# Patient Record
Sex: MALE | Race: WHITE | URBAN - METROPOLITAN AREA
[De-identification: names, ages, dates, MRNs, and addresses within clinical notes are randomized per-mention and may not be internally consistent; named-entity substitution may affect disease eponyms.]

---

## 2024-08-24 ENCOUNTER — OFFICE VISIT (OUTPATIENT)
Dept: ENDOCRINOLOGY CLINIC | Facility: CLINIC | Age: 29
End: 2024-08-24

## 2024-08-24 ENCOUNTER — TELEPHONE (OUTPATIENT)
Dept: ENDOCRINOLOGY CLINIC | Facility: CLINIC | Age: 29
End: 2024-08-24

## 2024-08-24 ENCOUNTER — LAB ENCOUNTER (OUTPATIENT)
Dept: LAB | Facility: HOSPITAL | Age: 29
End: 2024-08-24
Attending: FAMILY MEDICINE
Payer: COMMERCIAL

## 2024-08-24 VITALS
BODY MASS INDEX: 44.1 KG/M2 | SYSTOLIC BLOOD PRESSURE: 123 MMHG | HEIGHT: 71 IN | HEART RATE: 80 BPM | DIASTOLIC BLOOD PRESSURE: 85 MMHG | WEIGHT: 315 LBS

## 2024-08-24 DIAGNOSIS — E03.9 ACQUIRED HYPOTHYROIDISM: Primary | ICD-10-CM

## 2024-08-24 DIAGNOSIS — E07.9 DISEASE OF THYROID GLAND: ICD-10-CM

## 2024-08-24 DIAGNOSIS — E03.9 MYXEDEMA HEART DISEASE: Primary | ICD-10-CM

## 2024-08-24 DIAGNOSIS — I51.9 MYXEDEMA HEART DISEASE: Primary | ICD-10-CM

## 2024-08-24 DIAGNOSIS — E66.01 MORBID OBESITY (HCC): ICD-10-CM

## 2024-08-24 DIAGNOSIS — E66.01 CLASS 3 SEVERE OBESITY WITH BODY MASS INDEX (BMI) OF 45.0 TO 49.9 IN ADULT, UNSPECIFIED OBESITY TYPE, UNSPECIFIED WHETHER SERIOUS COMORBIDITY PRESENT (HCC): ICD-10-CM

## 2024-08-24 DIAGNOSIS — E07.9 THYROID DISEASE: ICD-10-CM

## 2024-08-24 DIAGNOSIS — E03.9 ACQUIRED HYPOTHYROIDISM: ICD-10-CM

## 2024-08-24 LAB
ANION GAP SERPL CALC-SCNC: 7 MMOL/L (ref 0–18)
BUN BLD-MCNC: 10 MG/DL (ref 9–23)
BUN/CREAT SERPL: 11.8 (ref 10–20)
CALCIUM BLD-MCNC: 9.4 MG/DL (ref 8.7–10.4)
CHLORIDE SERPL-SCNC: 109 MMOL/L (ref 98–112)
CHOLEST SERPL-MCNC: 197 MG/DL (ref ?–200)
CO2 SERPL-SCNC: 26 MMOL/L (ref 21–32)
CREAT BLD-MCNC: 0.85 MG/DL
EGFRCR SERPLBLD CKD-EPI 2021: 122 ML/MIN/1.73M2 (ref 60–?)
EST. AVERAGE GLUCOSE BLD GHB EST-MCNC: 108 MG/DL (ref 68–126)
FASTING PATIENT LIPID ANSWER: YES
FASTING STATUS PATIENT QL REPORTED: YES
GLUCOSE BLD-MCNC: 78 MG/DL (ref 70–99)
HBA1C MFR BLD: 5.4 % (ref ?–5.7)
HDLC SERPL-MCNC: 42 MG/DL (ref 40–59)
LDLC SERPL CALC-MCNC: 130 MG/DL (ref ?–100)
NONHDLC SERPL-MCNC: 155 MG/DL (ref ?–130)
OSMOLALITY SERPL CALC.SUM OF ELEC: 292 MOSM/KG (ref 275–295)
POTASSIUM SERPL-SCNC: 4.6 MMOL/L (ref 3.5–5.1)
SODIUM SERPL-SCNC: 142 MMOL/L (ref 136–145)
T4 FREE SERPL-MCNC: 1.2 NG/DL (ref 0.8–1.7)
THYROPEROXIDASE AB SERPL-ACNC: 50 U/ML (ref ?–60)
TRIGL SERPL-MCNC: 140 MG/DL (ref 30–149)
TSI SER-ACNC: 3.12 MIU/ML (ref 0.55–4.78)
VLDLC SERPL CALC-MCNC: 25 MG/DL (ref 0–30)

## 2024-08-24 PROCEDURE — 99205 OFFICE O/P NEW HI 60 MIN: CPT | Performed by: INTERNAL MEDICINE

## 2024-08-24 PROCEDURE — 83036 HEMOGLOBIN GLYCOSYLATED A1C: CPT

## 2024-08-24 PROCEDURE — 84443 ASSAY THYROID STIM HORMONE: CPT | Performed by: INTERNAL MEDICINE

## 2024-08-24 PROCEDURE — 3079F DIAST BP 80-89 MM HG: CPT | Performed by: INTERNAL MEDICINE

## 2024-08-24 PROCEDURE — 3074F SYST BP LT 130 MM HG: CPT | Performed by: INTERNAL MEDICINE

## 2024-08-24 PROCEDURE — 80061 LIPID PANEL: CPT

## 2024-08-24 PROCEDURE — 80048 BASIC METABOLIC PNL TOTAL CA: CPT

## 2024-08-24 PROCEDURE — 3008F BODY MASS INDEX DOCD: CPT | Performed by: INTERNAL MEDICINE

## 2024-08-24 PROCEDURE — 86800 THYROGLOBULIN ANTIBODY: CPT

## 2024-08-24 PROCEDURE — 84439 ASSAY OF FREE THYROXINE: CPT | Performed by: INTERNAL MEDICINE

## 2024-08-24 PROCEDURE — 36415 COLL VENOUS BLD VENIPUNCTURE: CPT | Performed by: INTERNAL MEDICINE

## 2024-08-24 PROCEDURE — 84432 ASSAY OF THYROGLOBULIN: CPT

## 2024-08-24 PROCEDURE — 86376 MICROSOMAL ANTIBODY EACH: CPT | Performed by: INTERNAL MEDICINE

## 2024-08-24 RX ORDER — LEVOTHYROXINE SODIUM 75 UG/1
1 TABLET ORAL DAILY
COMMUNITY
Start: 2023-05-31

## 2024-08-24 NOTE — PATIENT INSTRUCTIONS
Will do labs today   If labs are normal, follow up annually   If labs are abnormal, we will discuss more next visit   Will review and will start Thyroid medication  levothyroxine if needed    Take you medication on empty stomach, not with any other medication or food. Wait 60 minutes before eating. Wait 4 hours before taking Vitamins, Calcium or iron.  Wait 60 minutes before eating. Do not take the medication on the morning of the lab test. Do not take Biotin/Turmeric 1 week before the test.    During pregnancy, we need to increase thyroid medication dose (take double your usual       This is a very helpful website     [email or web address]-thyroid-information/  [email or web address]://[email or web address].org/thyroid-information/      Don’t take your thyroid hormone at the same time as:  Walnuts.  Soybean flour.  Cottonseed meal.  Iron supplements or multivitamins containing iron.  Calcium supplements.  Antacids that contain aluminum, magnesium or calcium.  Some ulcer medicines, such as sucralfate (Carafate).  Some cholesterol-lowering drugs, such as those containing cholestyramine (Prevalite, Locholest) and colestipol (Colestid).  To avoid possible problems, eat these foods or use these products several hours before or after you take your thyroid medicine.    Supplements containing biotin, common in hair and nail products, can make it hard to measure how much thyroid hormone is in the body. Biotin does not affect thyroid hormone levels. But supplements that have biotin should be stopped for at least a week before measuring thyroid function so that the measurement is correct.

## 2024-08-24 NOTE — TELEPHONE ENCOUNTER
Please call pt with work result    normal Thyroid levels- does not need thyroid medicine now     neg hashimoto's antibodies       LDL is high- limit high fat /fried food     Follow up with PCP and RD   Thyroid labs annually       Thanks

## 2024-08-24 NOTE — PROGRESS NOTES
New Patient Evaluation - History and Physical    CONSULT - Reason for Visit:    hypothyroid, obese   Requesting Physician:   ..No primary care provider on file.    CHIEF COMPLAINT:    Chief Complaint   Patient presents with    Consult     Pt in to discuss thyroid issues         HISTORY OF PRESENT ILLNESS:   Rox Finnegan is a 29 year old male who presents with  hypothyroid, obese DLP  Was seen with his GF   Was started on LT4 few yrs ago , for 1.5 yrs. 75 mcg/day    Stopped d/t lack of benefit early 2024  He denied feeling difference while on off LT4.        Compression symptoms: denied except the underlined ones SOB, dysphagia, odynophagia, change in voice, hoarseness, neck pain or neck mass.     The patient endorses the bolded symptoms: intolerance to cold, constipation, decreased lacrimation, fatigue; anxiety, heat intolerance, insomnia, tremors, wt loss, wt gain during covid, lid lag, palpitations, proptosis,   dyspnea, difficulty breathing when lying down, dysphagia, sensation of food getting stuck in the throat, choking sensation when lying flat, pooling of saliva, dysphonia, voice changes, hoarseness; none.    No eye or vision changes.   + heat   intolerance  Hair and skin: hair loss      Neck surgery: No  Neck radiation: No   Biotin: no  Turmeric:no  Family history of thyroid cancer in 1st degree relatives: no      GF reports snoring but he does not stop breathing   He reports sometimes get headache in the morning or does not feel refreshed when gets up in the morning     Lab Results   Component Value Date    A1C 5.4 08/24/2024           ASSESSMENT AND PLAN:  Rox Finnegan is a 29 year old male who presents with  hypothyroid, obese DLP  He was on LT4 but stopped few months ago   Clinically nonspecific symptoms.   Labs today showed normal TFT and neg TPO. CMP is normal. LDL is high.   Plan   Discussed wt loss and lifestyle changes   Refer to RD.   Follow up with PCP     PAST MEDICAL HISTORY:   History  reviewed. No pertinent past medical history.  Hypothyroid  Eczema    PAST SURGICAL HISTORY:   History reviewed. No pertinent surgical history.  no  CURRENT MEDICATIONS:    No outpatient medications have been marked as taking for the 8/24/24 encounter (Office Visit) with Miguel Medeiros MD.   no    ALLERGIES:  Not on File  no  SOCIAL HISTORY:    Social History     Socioeconomic History    Marital status: Single   Tobacco Use    Smoking status: Never    Smokeless tobacco: Never   HR   Smoking cigars/marijuana  Marijuana x3/wk  Etoh x2/mo  Drugs no  FAMILY HISTORY:   History reviewed. No pertinent family history.   + DM in [place]   Colon cancer in granparent's siblings    REVIEW OF SYSTEMS:  All negative other than HPI    PHYSICAL EXAM:   Height: 5' 11\" (180.3 cm) (08/24 1043)  Weight: 330 lb (149.7 kg) (08/24 1043)  BSA (Calculated - sq m): 2.61 sq meters (08/24 1043)  Pulse: 80 (08/24 1043)  BP: 123/85 (08/24 1043)  Temp: --  Do Not Use - Resp Rate: --  SpO2: --    Body mass index is 46.03 kg/m².    CONSTITUTIONAL:  Awake and alert. Age appropriate, good hygiene not in acute distress. Well-nourished and well developed. no acute distress   PSYCH:   Orientated to time, place, person & situation, Normal mood and affect, memory intact, normal insight and judgment, cooperative  Neuro: speech is clear. Awake, alert, no aphasia, no facial asymmetry, no nuchal rigidity  EYES:  No proptosis, no ptosis, conjunctiva normal  ENT:  Normocephalic, atraumatic  Eye: EOMI, normal lids, no discharge, no conjunctival erythema. No exophthalmos/proptosis, Ptosis negative   No rhinorrhea, moist oral mucosa  Neck: full range of motion  Neck/Thyroid: neck inspection: normal, No scar, No goiter   LUNGS:  No acute respiratory distress, non-labored respiration. Speaking full sentences  CARDIOVASCULAR:  regular rate   ABDOMEN:  No abdominal pain.   SKIN:  no bruising or bleeding, no rashes and no lesions, Skin is dry, no obvious rashes or  lesions  EXTREMITIES: no gross abnormality   MSK: Moves extremities spontaneously. full range of motion in all major joints      DATA:     Pertinent data reviewed     4/6/19  1:21 PM    HEMOGLOBIN A1C  <=5.6 % 5.3     4/6/19  1:21 PM    SODIUM  135 - 148 mEq/L 142   POTASSIUM  3.4 - 5.2 mEq/L 4.3   CHLORIDE  96 - 109 mEq/L 105   CO2 TOTAL  24 - 33 mEq/L 29.1   ANION GAP  10 - 20 12.2   UREA NITROGEN (BUN)  10 - 26 mg/dL 17   CREATININE  0.7 - 1.5 mg/dL 0.95   BUN/CREAT RATIO  8 - 25 ratio 17.9   eGFR AFR AMERICAN >=60   eGFR NON AFR AMER >=60       GLUCOSE  60 - 99 mg/dL 85   Comment:     CALCIUM  8.2 - 10.7 mg/dL 9.2   TOTAL PROTEIN  6 - 8.5 g/dL 7.3   ALBUMIN  3 - 5.5 g/dL 4.2   BILIRUBIN TOTAL  0.2 - 1.2 mg/dL 0.87   ALK PHOSPHATASE  30 - 130 IU/L 60   ALT  0 - 65 IU/L 87 High        4/6/19  1:21 PM    CHOLESTEROL  115 - 199 mg/dL 181   TRIGLYCERIDE  30 - 150 mg/dL 90   HDL CHOLESTEROL  See comment mg/dL 49 Low    Comment: HDL CHOL <40 - Major Risk Factor for CHD  HDL CHOL 40-58 - Moderate Risk Factor for CHD  HDL CHOL >=59 - Negative Risk Factor for CHD   LDL CHOL CALC  See comment mg/dL 114 High         Recent Labs     08/24/24  1138   TSH 3.123   T4F 1.2     No results found.  Lipid Panel        Component Value Flag Ref Range Units Status    Cholesterol, Total 197      <200 mg/dL Final    Comment:    Desirable  <200 mg/dL  Borderline  200-239 mg/dL  High      >=240 mg/dL        HDL Cholesterol 42      40 - 59 mg/dL Final    Comment:    Interpretive Information:   An HDL cholesterol <40 mg/dL is low and constitutes a coronary heart disease risk factor. An HDL cholesterol >60 mg/dL is a negative risk factor for coronary heart disease.        Triglycerides 140      30 - 149 mg/dL Final    Comment:    Reference interval for fasting triglycerides  Desirable: <150 mg/dL  Borderline: 150-199 mg/dL  High: 200-499 mg/dL  Very High: >=500 mg/dL          LDL Cholesterol 130      <100 mg/dL Final    Comment:    Optimal             <100 mg/dL   Near/Above OptimaL 100-129 mg/dL   Borderline High    130-159 mg/dL   High               160-189 mg/dL    Very High          >190 mg/dL         VLDL 25      0 - 30 mg/dL Final    Non HDL Chol 155      <130 mg/dL Final    Comment:    Desirable  <130 mg/dL   Borderline  130-159 mg/dL   High        160-189 mg/dL       Very high >=190 mg/dL        Patient Fasting for Lipid? Yes        Final                  Basic Metabolic Panel (8)        Component Value Flag Ref Range Units Status    Glucose 78      70 - 99 mg/dL Final    Sodium 142      136 - 145 mmol/L Final    Potassium 4.6      3.5 - 5.1 mmol/L Final    Chloride 109      98 - 112 mmol/L Final    CO2 26.0      21.0 - 32.0 mmol/L Final    Anion Gap 7      0 - 18 mmol/L Final    BUN 10      9 - 23 mg/dL Final    Creatinine 0.85      0.70 - 1.30 mg/dL Final    BUN/CREA Ratio 11.8      10.0 - 20.0  Final    Calcium, Total 9.4      8.7 - 10.4 mg/dL Final    Calculated Osmolality 292      275 - 295 mOsm/kg Final    eGFR-Cr 122      >=60 mL/min/1.73m2 Final    Patient Fasting for BMP? Yes        Final                  Hemoglobin A1C [E]        Component Value Flag Ref Range Units Status    HgbA1C 5.4      <5.7 % Final    Comment:     Normal HbA1C:     <5.7%      Pre-Diabetic:     5.7 - 6.4%      Diabetic:         >6.4%      Diabetic Control: <7.0%        Estimated Average Glucose 108      68 - 126 mg/dL Final    Comment:    eAG is the estimated average glucose calculated from Hgb A1c according to the formula recommended by the American Diabetes Association. eAG levels reflect the long term average glucose and may not correlate with random or fasting glucose levels since these represent specific points in time.                         Thyroid Peroxidase (TPO) AB        Component Value Flag Ref Range Units Status    Anti-Thyroperoxidase 50      <60 U/mL Final                  TSH and Free T4        Component Value Flag Ref Range Units Status    Free T4  1.2      0.8 - 1.7 ng/dL Final    TSH 3.123      0.550 - 4.780 mIU/mL Final                  Orders Placed This Encounter   Procedures    Thyroid Peroxidase (TPO) AB    TSH and Free T4    Lipid Panel    Basic Metabolic Panel (8)    Hemoglobin A1C [E]     Orders Placed This Encounter    Thyroid Peroxidase (TPO) AB     Order Specific Question:   Release to patient     Answer:   Immediate    TSH and Free T4     Order Specific Question:   Release to patient     Answer:   Immediate    Lipid Panel     Standing Status:   Future     Number of Occurrences:   1     Standing Expiration Date:   8/24/2025     Order Specific Question:   Release to patient     Answer:   Immediate    Basic Metabolic Panel (8)     Standing Status:   Future     Number of Occurrences:   1     Standing Expiration Date:   8/24/2025     Order Specific Question:   Release to patient     Answer:   Immediate    Hemoglobin A1C [E]     Standing Status:   Future     Number of Occurrences:   1     Standing Expiration Date:   8/24/2025     Order Specific Question:   Release to patient     Answer:   Immediate    levothyroxine 75 MCG Oral Tab     Sig: Take 1 tablet (75 mcg total) by mouth daily.          This is a specialized patient consultation in endocrinology and required comprehensive review of prior records, as well as current evaluation, with time required for consideration of complex endocrine issues and consultation. For this visit, I personally interviewed the patient, and family member if accompanied, performed the pertinent parts of the history and physical examination. ROS included screening for appropriate endocrine conditions.   Today's diagnosis and plan were reviewed in detail with the patient who states understanding and agrees with plan. I discussed with the patient possible diagnosis, differential diagnosis, need for work up, treatment options, alternatives and side effects.     Please see note for details about time spent which includes:   ·  pre-visit preparation  · reviewing records  · face to face time with the patient   · timely documentation of the encounter  · ordering medications/tests  · communication with care team  · care coordination    I appreciate the opportunity to be part of your patient's medical care and will keep you, as the referring and primary physicians, informed about the care of your patient. Please feel free to contact me should you have any questions.    The 21st Century Cures Act makes medical notes like these available to patients in the interest of transparency. Please be advised this is a medical document. Medical documents are intended to carry relevant information, facts as evident, and the clinical opinion of the practitioner. The medical note is intended as peer to peer communication and may appear blunt or direct. It is written in medical language and may contain abbreviations or verbiage that are unfamiliar.   Miguel Medeiros MD

## 2024-08-27 LAB
THYROGLOB AB: <1 IU/ML
THYROGLOB IMA: 15.1 NG/ML

## 2025-06-18 NOTE — PROGRESS NOTES
Subjective:   Rox Finnegan is a 30 year old male who presents for Physical (Lab order, (ACT Score # 24))   Patient is a pleasant 30-year-old male with past medical history significant for obesity and thyroid disorder not on medication following with endocrinology Waldem[facility].  Patient presents office today new to this office and new to this provider to establish care and obtain physical.  Patient states his health is ok. Knows he needs to lose weight. Also has issues with rotating abdominal pain in LLQ and acid reflux. Also has periods of apnea.     PMH: Asthma as child. No issues at current.  Eczema: no issues uses lotions.   Thyroid issues: off meds.   PSH: none  Meds: none    Diet: Higher protein, more vegetables, more fruit. Mostly home cooked. Coffee and pushing more water. Stays away from pop.   Exercise: not as much as he would like. Occasional walk. Wants to walk more. Educated on recommendations.  Sleep: 7-8 hours per night. Waking up and snoring loudly, can have periods of apnea. Wakes up gasping. Has daytime fatigue.  Stress: GF 5 weeks pregnant. Likes to watch football, golf, and play video games.   Social: Dating, lives in [place], owns home, baby on the way   Sexually Active: yes  Prophylaxis: currently pregnant.   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   Vaccinations: Tetanus in office to            Past Medical History[1]   Past Surgical History[2]     History/Other:    Chief Complaint Reviewed and Verified  Nursing Notes Reviewed and   Verified  Tobacco Reviewed  Allergies Reviewed  Medications Reviewed    Problem List Reviewed  Medical History Reviewed  Surgical History   Reviewed  Family History Reviewed  Social History Reviewed         Tobacco:  Tobacco Use[3]  E-Cigarettes/Vaping       Questions Responses    E-Cigarette Use Never User    Passive Exposure No          E-Cigarette/Vaping Substances        Questions Responses    Nicotine No    THC No    CBD No    Flavoring No          E-Cigarette/Vaping Devices       Questions Responses    Disposable No    Pre-filled or Refillable Cartridge No    Refillable Tank No    Pre-filled Pod No           Tobacco cessation counseling for <3 minutes.    Current Medications[4]      Review of Systems:  Review of Systems   Constitutional: Negative.  Negative for activity change, chills and fever.   HENT: Negative.  Negative for congestion, ear pain, postnasal drip, sinus pain, sore throat and trouble swallowing.    Respiratory:  Positive for apnea. Negative for cough, shortness of breath and wheezing.    Cardiovascular: Negative.  Negative for chest pain and leg swelling.   Gastrointestinal: Negative.  Negative for abdominal pain, blood in stool, constipation, diarrhea, nausea and vomiting.   Endocrine: Negative.    Genitourinary: Negative.  Negative for difficulty urinating, dysuria and flank pain.   Musculoskeletal: Negative.  Negative for arthralgias, back pain and neck stiffness.   Skin: Negative.  Negative for color change and rash.   Neurological: Negative.  Negative for dizziness and headaches.   Hematological:  Negative for adenopathy.         Objective:   /75 (BP Location: Left arm, Patient Position: Sitting, Cuff Size: large)   Pulse 83   Ht 5' 11\" (1.803 m)   Wt (!) 332 lb (150.6 kg)   SpO2 97%   BMI 46.30 kg/m²  Estimated body mass index is 46.3 kg/m² as calculated from the following:    Height as of this encounter: 5' 11\" (1.803 m).    Weight as of this encounter: 332 lb (150.6 kg).  Physical Exam  Vitals and nursing note reviewed.   Constitutional:       Appearance: Normal appearance. He is obese.   HENT:      Head: Normocephalic.      Right Ear: Tympanic membrane, ear canal and external ear normal. There is no impacted cerumen.      Left Ear: Tympanic membrane, ear canal and external ear normal. There is no impacted cerumen.      Nose: Nose normal. No  congestion or rhinorrhea.      Mouth/Throat:      Mouth: Mucous membranes are moist.      Pharynx: No oropharyngeal exudate or posterior oropharyngeal erythema.   Eyes:      Extraocular Movements: Extraocular movements intact.      Pupils: Pupils are equal, round, and reactive to light.   Cardiovascular:      Rate and Rhythm: Normal rate and regular rhythm.      Pulses: Normal pulses.      Heart sounds: Normal heart sounds. No murmur heard.  Pulmonary:      Effort: Pulmonary effort is normal. No respiratory distress.      Breath sounds: Normal breath sounds. No wheezing.   Abdominal:      General: There is no distension.      Palpations: Abdomen is soft.      Tenderness: There is no abdominal tenderness.   Musculoskeletal:         General: Normal range of motion.      Cervical back: Normal range of motion and neck supple.      Right lower leg: No edema.      Left lower leg: No edema.   Lymphadenopathy:      Cervical: No cervical adenopathy.   Skin:     General: Skin is warm and dry.      Capillary Refill: Capillary refill takes less than 2 seconds.      Findings: No rash.   Neurological:      General: No focal deficit present.      Mental Status: He is alert and oriented to person, place, and time.   Psychiatric:         Mood and Affect: Mood normal.         Behavior: Behavior normal.           Assessment & Plan:   1. Encounter for wellness examination in adult (Primary)  -     Hemoglobin A1C; Future; Expected date: 06/19/2025  -     CBC With Differential With Platelet; Future; Expected date: 06/19/2025  -     Comp Metabolic Panel (14); Future; Expected date: 06/19/2025  -     TSH W Reflex To Free T4; Future; Expected date: 06/19/2025  -     Lipid Panel; Future; Expected date: 06/19/2025  2. Morbid obesity (HCC)  -     Diagnostic Sleep Study  -     [facility] Weight Management Medical Nutrition Therapy DIETITIAN - [place] Location  3. LLQ pain  4. Apnea  -     General sleep study; Future; Expected date:  07/19/2025  5. Need for tetanus booster  6. Epigastric pain  -     Famotidine; Take 1 tablet (20 mg total) by mouth 2 (two) times daily as needed for Heartburn.  Dispense: 40 tablet; Refill: 1    1. Encounter for wellness examination in adult  Wellness labs ordered.  Encouraged increasing physical activity which includes raising heart rate 3 to 5 days/week for at least 30 minutes at a time, while also performing mild strength exercises.  Patient counseled on importance of dietary modifications, which may include limiting fats, red meat, and carbohydrates, while increasing fruit and vegetable intake, and trying to adhere to a low sodium/Mediterranean diet.  Encouraged to manage stress.  Encouraged good sleep habits.  Encouraged good sexual health.    - Hemoglobin A1C; Future  - CBC With Differential With Platelet; Future  - Comp Metabolic Panel (14); Future  - TSH W Reflex To Free T4; Future  - Lipid Panel; Future    2. Morbid obesity (HCC)  -BMI in office today 46.30  -Nutrition consult placed  -Recommend low fat/low cholesterol diet and mediterranean diet  -Advised to decrease the amount of carbohydrates in diet (bread products, rice, pasta, potatoes, cereals, starchy vegetables)  -Avoid fried, fatty, greasy foods, cheese, red-meats, egg yolks and processed foods  -Recommend to cut out foods and beverages that contain high sugars.  -Increase your vegetable intake.  -Consider over the counter metamucil once daily with 8 oz water to lower cholesterol   -Try to exercise at least 30 minutes per day 3-5 times per week with mixture of cardio and weight training.     - DIAGOSTIC SLEEP STUDY  - Located within [facility] Weight Management Medical Nutrition Therapy DIETITIAN - [place] Location    3. LLQ pain  Rotating abdominal pain  Eating more veggies  Gas pain  Educated on gas x otc  List of fodmaps provided    4. Apnea  STOP BANG - 6  Do you snore loudly? y  Do you often feel tired, fatigued, or sleepy during the daytime? y  Has  anyone observed you stop breathing during sleep? y  Do you have or are you being treated for high blood pressure? n  BMI greater than 35? y  Age greater than 50? n  Neck circumference greater than 40 cm?y  Male Gender? y    Obtain sleep study    - General sleep study; Future    5. Need for tetanus booster  Advise tetanus closer to term for pregnant SO  Will call and place order    6. Epigastric pain  Intermittent pain epigastric   Trial pepcid  FU as needed  - famotidine 20 MG Oral Tab; Take 1 tablet (20 mg total) by mouth 2 (two) times daily as needed for Heartburn.  Dispense: 40 tablet; Refill: 1      Return if symptoms worsen or fail to improve.    Darrius Bryant, RYNE, 6/18/2025, 2:23 PM          [1]   Past Medical History:   Asthma (HCC)   [2] History reviewed. No pertinent surgical history.  [3]   Social History  Tobacco Use   Smoking Status Some Days    Types: Cigars    Passive exposure: Never   Smokeless Tobacco Never   [4]   Current Outpatient Medications   Medication Sig Dispense Refill    famotidine 20 MG Oral Tab Take 1 tablet (20 mg total) by mouth 2 (two) times daily as needed for Heartburn. 40 tablet 1    levothyroxine 75 MCG Oral Tab Take 1 tablet (75 mcg total) by mouth daily. (Patient not taking: Reported on 8/24/2024)      metRONIDAZOLE 0.75 % External Cream APPLY EXTERNALLY TO FACE TWICE DAILY AS DIRECTED (Patient not taking: Reported on 8/24/2024) 45 g 2